# Patient Record
Sex: FEMALE | Race: WHITE | URBAN - METROPOLITAN AREA
[De-identification: names, ages, dates, MRNs, and addresses within clinical notes are randomized per-mention and may not be internally consistent; named-entity substitution may affect disease eponyms.]

---
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
## 2023-08-19 NOTE — MR AVS SNAPSHOT
[facility]  [facility], [address]  [phone]               Thank you for choosing us for your health care visit with Elyse Lieberman MD.  We are glad to serve you and happy to provide you with this summ healthy? Make sure to talk to your teen about who his or her friends are and how they spend time together. Peer pressure can be a problem among teenagers. · Life at home. How is your child’s behavior? Does he or she get along with others in the family?  Is how to spend free time. You can’t always have the final say, but you can encourage healthy habits. Your teen should:  · Get at least 30 minutes to 60 minutes of physical activity every day. This time can be broken up throughout the day.  After-school sports · Let the health care provider know if you or your teen have questions about hygiene or acne. · Bring your teen to the dentist at least twice a year for teeth cleaning and a checkup. · Remind your teen to brush and floss his or her teeth before bed.   Sle driving. Teach your teen to always wear a seat belt, drive the speed limit, and follow the rules of the road. Do not allow your teenager to text or talk on a cell phone while driving. (And don’t do this yourself!  Remember, you set an example.)  · Set rules eased. Offer your love and support.  If your teen talks about death or suicide, seek help right away.      Next checkup at: _______________________________     PARENT NOTES:  Date Last Reviewed: 10/2/2014  © 5027-9906 [employer] [facility] [address] often. Sometimes toddlers need to try a food 10 times before they actually accept and enjoy it. It is also important to encourage play time as soon as they start crawling and walking.  As your children grow, continue to help them live a healthy active lifes Oriented - self; Oriented - place; Oriented - time

## 2024-01-23 NOTE — PROGRESS NOTES
HR=94 bpm, XIZK=015/92 mmhg, SpO2=97.0 %, Resp=18 B/min, EtCO2=35 mmHg, Apnea=0 Seconds, Pain=0, Mikaela=9, Smith=2 Jenney Krabbe is a 11year old female. HPI:     HPI     EP/11year old here for a complete exam.  LDE was 6/12/17 she was last seen on 10/02/17 with a hx of myopia OU. Patient is wearing SCL.    She states that her vision is good and she has no [place] Exam       Right Left    Disc Normal Normal    C/D Ratio 0.3 0.3    Macula Normal Normal    Vessels Normal Normal    Periphery Normal Normal            Refraction     Wearing Rx       Sphere Cylinder    Right -4.25 Sphere    Left -4.50 Sphere    Type:  for

## (undated) NOTE — LETTER
VACCINE ADMINISTRATION RECORD  PARENT / GUARDIAN APPROVAL  Date: 6/3/2019  Vaccine administered to: Ti Serrano     : 2005    MRN: [id number]    A copy of the appropriate Centers for Disease Control and Prevention Vaccine Information statement

## (undated) NOTE — Clinical Note
VACCINE ADMINISTRATION RECORD  PARENT / GUARDIAN APPROVAL  Date: 2017  Vaccine administered to: Amy Chun     : 2005    MRN: [id number]    A copy of the appropriate Centers for Disease Control and Prevention Vaccine Information statemen

## (undated) NOTE — LETTER
Name:  Amairani Lanier Year:  10th Grade Class: Student ID No.:   Address:  [address] [place] [address] Phone:  [phone] (home) [phone] (work) :  11year old   Name Relationship Lgl [address] Work [employer] polymorphic ventricular tachycardia? 13. Does anyone in your family have a heart problem, pacemaker, or implanted defibrillator? 12. Has anyone in your family had unexplained fainting, seizures, or near drowning?      BONE AND JOINT QUESTIONS Yes No 38. Have you ever had numbness, tingling, or weakness in your arms or legs after being hit or falling? 39.Have you ever been unable to move your arms / legs after being hit /fall? 40. Have you ever become ill while exercising in the heat?     41.  D MVP, aortic insufficiency) Yes    Eyes/Ears/Nose/Throat:  Pupils equal    Hearing Yes    Lymph nodes Yes    Heart*  · Murmurs (auscultation standing, supine, +/- Valsalva)  · Location of point of maximal impulse (PMI) Yes    Pulses Yes    Lungs Yes    Abdo defined in the [facility] Performance-Enhancing Substance Testing Program Protocol.  We have reviewed the policy and understand that I/our student may be asked to submit to testing for the presence of performance-enhancing substances in my/his/her body either dur

## (undated) NOTE — LETTER
[facility] [employer] of Child Health Examination       Student's Name  Henrryjessica Curtis Title                           Date  06/03/19     Signature HEALTH HISTORY          TO BE COMPLETED AND SIGNED BY PARENT/GUARDIAN AND VERIFIED BY HEALTH CARE PROVIDER    ALLERGIES  (Food, drug, insect, other)  Patient has no known allergies.  MEDICATION  (List all prescribed or taken on a regular basis.)    Current PHYSICAL EXAMINATION REQUIREMENTS (head circumference if <33 years old):   /70   Pulse 70   Ht 5' 7.5\" (1.715 m)   Wt 56.2 kg (124 lb)   LMP 05/01/2019 (Exact Date)   BMI 19.13 kg/m²     DIABETES SCREENING  BMI>85% age/sex  No And any two of the fo Cardiovascular/HTN Yes  Nutritional status Yes    Respiratory Yes                   Diagnosis of Asthma: No Mental Health Yes        Currently Prescribed Asthma Medication:            Quick-relief  medication (e.g. Short Acting Beta Antagonist):  No

## (undated) NOTE — Clinical Note
[facility] [employer] of Child Health Examination       Student's Name  Fadumo Garduno Birth D Title                           Date    (If adding dates to the above immunization history section, put your initials by date(s) and sign here.)   ALTERNATIVE PROOF OF IMMUNITY   1 (List all prescribed or taken on a regular basis.)    Current outpatient prescriptions:   •  Multiple Vitamins-Minerals (MULTIVITAL) Oral Tab, Take 1 tablet by mouth daily. , Disp: , Rfl:    Diagnosis of asthma?   Child wakes during the night coughing  Yes oz)  BMI 18.15 kg/m2    DIABETES SCREENING  BMI>85% age/sex  No And any two of the following:  Family History         No           Ethnic Minority             No                Signs of Insulin Resistance (hypertension, dyslipidemia, polycystic ovarian syn Quick-relief  medication (e.g. Short Acting Beta Antagonist): No          Controller medication (e.g. inhaled corticosteroid):   No Other   NEEDS/MODIFICATIONS required in the school setting  None DIETARY Needs/Restrictions     None   SPECIAL INSTR

## (undated) NOTE — LETTER
Name:  Fabricio Goss Year:  11th Grade Class: Student ID No.:   Address:  [address] [place] [address] Phone:  [phone] (home) [phone] (work) :  10year old   Name Relationship Curtis Carney [employer] Ph polymorphic ventricular tachycardia? 13. Does anyone in your family have a heart problem, pacemaker, or implanted defibrillator? 12. Has anyone in your family had unexplained fainting, seizures, or near drowning?      BONE AND JOINT QUESTIONS Yes No 38. Have you ever had numbness, tingling, or weakness in your arms or legs after being hit or falling? 39.Have you ever been unable to move your arms / legs after being hit /fall? 40. Have you ever become ill while exercising in the heat?     41.  D Eyes/Ears/Nose/Throat:  Pupils equal    Hearing Yes    Lymph nodes Yes    Heart*  · Murmurs (auscultation standing, supine, +/- Valsalva)  · Location of point of maximal impulse (PMI) Yes    Pulses Yes    Lungs Yes    Abdomen Yes    Genitourinary (males on defined in the [facility] Performance-Enhancing Substance Testing Program Protocol.  We have reviewed the policy and understand that I/our student may be asked to submit to testing for the presence of performance-enhancing substances in my/his/her body either dur

## (undated) NOTE — Clinical Note
Name:  Abdi Mancilla Year:  9th Grade Class: Student ID No.:   Address:  [address] [facility] [address] Phone:  [phone] (home) [phone] (work) :  13year old   Name Relationship Lgl [address] polymorphic ventricular tachycardia? 13. Does anyone in your family have a heart problem, pacemaker, or implanted defibrillator? 12. Has anyone in your family had unexplained fainting, seizures, or near drowning?      BONE AND JOINT QUESTIONS Yes No 38. Have you ever had numbness, tingling, or weakness in your arms or legs after being hit or falling? 39.Have you ever been unable to move your arms / legs after being hit /fall? 40. Have you ever become ill while exercising in the heat?     41.  D Eyes/Ears/Nose/Throat:  Pupils equal    Hearing Yes    Lymph nodes Yes    Heart*  · Murmurs (auscultation standing, supine, +/- Valsalva)  · Location of point of maximal impulse (PMI) Yes    Pulses Yes    Lungs Yes    Abdomen Yes    Genitourinary (males on performance-enhancing substances in my/his/her body either during [employer] state series events or during the school day, and I/our student do/does hereby agree to submit to such testing and analysis by a certified laboratory.  We further understand and agree th

## (undated) NOTE — LETTER
VACCINE ADMINISTRATION RECORD  PARENT / GUARDIAN APPROVAL  Date: 2017  Vaccine administered to: Jose Marie     : 2005    MRN: [id number]    A copy of the appropriate Centers for Disease Control and Prevention Vaccine Information stateme

## (undated) NOTE — LETTER
[facility] [employer] of Child Health Examination       Student's Name  Jes Curtis D Title                           Date     Signature HEALTH HISTORY          TO BE COMPLETED AND SIGNED BY PARENT/GUARDIAN AND VERIFIED BY HEALTH CARE PROVIDER    ALLERGIES  (Food, drug, insect, other) MEDICATION  (List all prescribed or taken on a regular basis.)     Diagnosis of asthma?   Child wakes during adult)   Pulse 83   Ht 5' 7\" (1.702 m)   Wt 56.7 kg (125 lb)   BMI 19.58 kg/m²     DIABETES SCREENING  BMI>85% age/sex  No And any two of the following:  Family History No   Ethnic Minority  No          Signs of Insulin Resistance (hypertension, dyslipide Currently Prescribed Asthma Medication:            Quick-relief  medication (e.g. Short Acting Beta Antagonist): No          Controller medication (e.g. inhaled corticosteroid):   No Other   NEEDS/MODIFICATIONS required in the school setting  None DIET

## (undated) NOTE — MR AVS SNAPSHOT
[place]  [address]  [phone]               Thank you for choosing us for your health care visit with [facility].  Maurine Bloch, MD.  We are glad to serve you and happy to provide you with this luque visit, view other health information and more. To sign up or find more information on getting   Proxy Access to your child’s MyChart go to [email or web address]. [email or web address] and click on the   Sign Up Forms link in the Additional Information box on the right.